# Patient Record
Sex: MALE | Race: WHITE | URBAN - METROPOLITAN AREA
[De-identification: names, ages, dates, MRNs, and addresses within clinical notes are randomized per-mention and may not be internally consistent; named-entity substitution may affect disease eponyms.]

---

## 2023-01-25 ENCOUNTER — OFFICE VISIT (OUTPATIENT)
Dept: HEMATOLOGY | Age: 58
End: 2023-01-25
Payer: COMMERCIAL

## 2023-01-25 ENCOUNTER — HOSPITAL ENCOUNTER (OUTPATIENT)
Dept: LAB | Age: 58
Discharge: HOME | End: 2023-01-25

## 2023-01-25 VITALS
BODY MASS INDEX: 27.9 KG/M2 | DIASTOLIC BLOOD PRESSURE: 78 MMHG | HEART RATE: 88 BPM | HEIGHT: 72 IN | SYSTOLIC BLOOD PRESSURE: 142 MMHG | OXYGEN SATURATION: 98 % | WEIGHT: 206 LBS | TEMPERATURE: 98.1 F

## 2023-01-25 DIAGNOSIS — R74.8 ELEVATED LIVER ENZYMES: Primary | ICD-10-CM

## 2023-01-25 LAB — SENTARA SPECIMEN COL,SENBCF: NORMAL

## 2023-01-25 PROCEDURE — 99001 SPECIMEN HANDLING PT-LAB: CPT

## 2023-01-25 RX ORDER — SIMVASTATIN 20 MG/1
TABLET, FILM COATED ORAL
COMMUNITY
Start: 2023-01-24

## 2023-01-25 NOTE — PROGRESS NOTES
[facility], [place], [place], [facility] [place], [place]      ARSALAN Dailey, [facility]NP-BC   Alicia Ernst, [facility]-   Garth Simmonds, FNP-ANANYA Sutherland, FNP-C   Mónica Blackmon, AGPCNP-BC      [address]   at [address], [address]  [address].   [phone]   FAX: [address]   at [address], [address] [place] [address], [address]   [phone]   FAX: [phone]       Patient Care Team:  Jo-Ann Fitch MD as PCP - General (Family Medicine)  Jo-Ann Fitch MD as Referring Provider [facility])      The clinicians listed above have asked me to see Leanne Saez in consultation regarding elevated liver enzymes and its management. All medical records sent by the referring physicians were reviewed including imaging studies     The patient is a 61 y.o. male who was found to have elevated liver enzymes in 10/2022. Serologic evaluation for markers of chronic liver disease has either not been performed or the results are not available. The most recent imaging of the liver was Ultrasound performed in 10/2022. Results suggest fatty liver disease. No liver mass lesions noted. The patient had not started any new medications within 3 months preceding the elevation in liver chemistries. In the office today the patient has the following symptoms: The patient feels well and has no complaints. The patient is not experiencing the following symptoms which are commonly seen with this liver disorder: fatigue, pain in the right side over the liver    The patient completes all daily activities without any functional limitations. ASSESSMENT AND PLAN:  Elevated liver enzymes  Intermittent elevation in liver transaminases of unclear etiology at this time.       Have performed laboratory testing to monitor liver function and degree of liver injury. This included   BMP, hepatic panel, CBC with platelet count. Liver transaminases are normal. ALP is normal.  Liver function is normal.  The platelet count is normal.      Based upon laboratory studies and imaging  the patient does not appear to have significant liver injury. Serologic testing for causes of chronic liver disease was ordered. Will perform additional serologic tests to screen for other causes of chronic liver disease. The most likely causes for the liver chemistry abnormalities were discussed with the patient and include   fatty liver disease    The need to perform an assessment of liver fibrosis was discussed with the patient. The Fibroscan can assess liver fibrosis and determine if a patient has advanced fibrosis or cirrhosis without the need for liver biopsy. This will be performed at the next office visit. If the Fibroscan suggests advanced fibrosis then a liver biopsy should be considered. The Fibroscan can be repeated annually or as often as clinically indicated to assess for fibrosis progression and/or regression. If the liver enzymes remain normal and the liver stiffness by Fibroscan remains normal or near normal over the next 1-2 years than no further monitoring is needed. Screening for Hepatocellular Carcinoma  HCC screening is not necessary if the patient has no evidence of cirrhosis. Treatment of other medical problems in patients with chronic liver disease  There are no contraindications for the patient to take most medications that are necessary for treatment of other medical issues. Normal doses of acetaminophen, as recommended on the label of the bottle, are not hepatotoxic except in the setting of daily alcohol use, even in patients with cirrhosis and can be utilized for pain.       Counseling for alcohol in patients with chronic liver disease  The patient was counseled regarding alcohol consumption and the effect of alcohol on chronic liver disease. The patient does not consume any significant amount of alcohol. Vaccinations   The need for vaccination against viral hepatitis A and B will be assessed with serologic and instituted as appropriate. The patient has received 2 doses and the booster dose of COVID-19 vaccine. Routine vaccinations against other bacterial and viral agents can be performed as indicated. Annual flu vaccination should be administered if indicated. ALLERGIES  No Known Allergies    MEDICATIONS  Current Outpatient Medications   Medication Sig    simvastatin (ZOCOR) 20 mg tablet      No current facility-administered medications for this visit. SYSTEM REVIEW NOT RELATED TO LIVER DISEASE OR REVIEWED ABOVE:  Constitution systems: Negative for fever, chills, weight gain, weight loss. Eyes: Negative for visual changes. ENT: Negative for sore throat, painful swallowing. Respiratory: Negative for cough, hemoptysis, SOB. Cardiology: Negative for chest pain, palpitations. GI:  Negative for constipation or diarrhea. : Negative for urinary frequency, dysuria, hematuria, nocturia. Skin: Negative for rash. Hematology: Negative for easy bruising, blood clots. Musculo-skelatal: Negative for back pain, muscle pain, weakness. Neurologic: Negative for headaches, dizziness, vertigo, memory problems not related to HE. Psychology: Negative for anxiety, depression. FAMILY HISTORY:  The father  of aortic aneurysm. The mother Has/had the following chronic disease(s): CAD, bypass. There is no family history of liver disease. SOCIAL HISTORY:  The patient is . The patient has no children. The patient stopped using tobacco products in     The patient has never consumed significant amounts of alcohol.     The patient has been abstinent from alcohol since     The patient currently works full time as custom window builder. PHYSICAL EXAMINATION:    Visit Vitals  BP (!) 142/78   Pulse 88   Temp 98.1 °F (36.7 °C)   Ht 6' (1.829 m)   Wt 206 lb (93.4 kg)   SpO2 98%   BMI 27.94 kg/m²     General: No acute distress. Eyes: Sclera anicteric. ENT: No oral lesions. Thyroid normal.  Nodes: No adenopathy. Skin: No spider angiomata. No jaundice. No palmar erythema. Respiratory: Lungs clear to auscultation. Cardiovascular: Regular heart rate. No murmurs. No JVD. Abdomen: Soft non-tender. Liver size normal to percussion/palpation. Spleen not palpable. No obvious ascites. Extremities: No edema. No muscle wasting. No gross arthritic changes. Neurologic: Alert and oriented. Cranial nerves grossly intact. No asterixis. LABORATORY STUDIES:  Liver [place] of [address] [place] [address] 1/25/2023   WBC 4.0 - 11.0 K/uL 6.0   ANC 1.8 - 7.7 K/uL 3.2   HGB 13.1 - 17.2 g/dL 14.4    - 440 K/uL 229   AST 10 - 37 U/L 21   ALT 5 - 40 U/L 35   Alk Phos 25 - 115 U/L 52   Bili, Total 0.2 - 1.2 mg/dL 0.5   Bili, Direct 0.0 - 0.3 mg/dL <0.2   Albumin 3.5 - 5.0 g/dL 4.2   BUN 6 - 22 mg/dL 15   Creat 0.5 - 1.2 mg/dL 1.1   Na 133 - 145 mmol/L 141   K 3.5 - 5.5 mmol/L 4.1   Cl 98 - 110 mmol/L 104   CO2 20 - 32 mmol/L 24   Glucose 70 - 99 mg/dL 86       SEROLOGIES:  Not available or performed. Testing was performed today. LIVER HISTOLOGY:  Not available or performed    ENDOSCOPIC PROCEDURES:  Not available or performed    RADIOLOGY:  10/2022. Ultrasound of liver. Echogenic consistent with fatty liver. No liver mass lesions. No dilated bile ducts. No ascites. OTHER TESTING:  Not available or performed    FOLLOW-UP:  All of the issues listed above in the Assessment and Plan were discussed with the patient. All questions were answered. The patient expressed a clear understanding of the above.     [facility] in 4 weeks for Fibroscan and to review all data and determine the treatment plan.       ABNER Borges-BC  [address] Liver [place] [address], [facility] [address]  [place], [address] [place] [address] Street [address]  [phone]

## 2023-01-26 PROBLEM — E78.00 HYPERCHOLESTEREMIA: Status: ACTIVE | Noted: 2023-01-26

## 2023-01-26 LAB
A-G RATIO,AGRAT: 1.6 RATIO (ref 1.1–2.6)
ABSOLUTE LYMPHOCYTE COUNT, 10803: 2.1 K/UL (ref 1–4.8)
ALBUMIN SERPL-MCNC: 4.2 G/DL (ref 3.5–5)
ALP SERPL-CCNC: 52 U/L (ref 25–115)
ALT SERPL-CCNC: 35 U/L (ref 5–40)
ANION GAP SERPL CALC-SCNC: 13 MMOL/L (ref 3–15)
AST SERPL W P-5'-P-CCNC: 21 U/L (ref 10–37)
BASOPHILS # BLD: 0.1 K/UL (ref 0–0.2)
BASOPHILS NFR BLD: 1 % (ref 0–2)
BILIRUB SERPL-MCNC: 0.5 MG/DL (ref 0.2–1.2)
BILIRUBIN, DIRECT,CBIL: <0.2 MG/DL (ref 0–0.3)
BUN SERPL-MCNC: 15 MG/DL (ref 6–22)
CALCIUM SERPL-MCNC: 9.7 MG/DL (ref 8.4–10.5)
CHLORIDE SERPL-SCNC: 104 MMOL/L (ref 98–110)
CO2 SERPL-SCNC: 24 MMOL/L (ref 20–32)
CREAT SERPL-MCNC: 1.1 MG/DL (ref 0.5–1.2)
EOSINOPHIL # BLD: 0.2 K/UL (ref 0–0.5)
EOSINOPHIL NFR BLD: 3 % (ref 0–6)
ERYTHROCYTE [DISTWIDTH] IN BLOOD BY AUTOMATED COUNT: 12.9 % (ref 10–15.5)
FE % SATURATION,PSAT: 23 % (ref 20–50)
FERRITIN SERPL-MCNC: 113 NG/ML (ref 22–322)
GLOBULIN,GLOB: 2.6 G/DL (ref 2–4)
GLOMERULAR FILTRATION RATE: >60 ML/MIN/1.73 SQ.M.
GLUCOSE SERPL-MCNC: 86 MG/DL (ref 70–99)
GRANULOCYTES,GRANS: 54 % (ref 40–75)
HBV SURFACE AB SER RIA-ACNC: NORMAL
HCT VFR BLD AUTO: 43 % (ref 39.3–51.6)
HEP B CORE AB, TOTAL, 006718: NORMAL
HEP B SURFACE AG SCRN, 006510: NORMAL
HGB BLD-MCNC: 14.4 G/DL (ref 13.1–17.2)
IRON,IRN: 72 MCG/DL (ref 45–160)
LYMPHOCYTES, LYMLT: 34 % (ref 20–45)
MCH RBC QN AUTO: 31 PG (ref 26–34)
MCHC RBC AUTO-ENTMCNC: 34 G/DL (ref 31–36)
MCV RBC AUTO: 91 FL (ref 80–95)
MONOCYTES # BLD: 0.5 K/UL (ref 0.1–1)
MONOCYTES NFR BLD: 8 % (ref 3–12)
NEUTROPHILS # BLD AUTO: 3.2 K/UL (ref 1.8–7.7)
PLATELET # BLD AUTO: 229 K/UL (ref 140–440)
PMV BLD AUTO: 10.3 FL (ref 9–13)
POTASSIUM SERPL-SCNC: 4.1 MMOL/L (ref 3.5–5.5)
PROT SERPL-MCNC: 6.8 G/DL (ref 6.4–8.3)
RBC # BLD AUTO: 4.72 M/UL (ref 3.8–5.8)
SODIUM SERPL-SCNC: 141 MMOL/L (ref 133–145)
TIBC,TIBC: 320 MCG/DL (ref 228–428)
UIBC SERPL-MCNC: 248 MCG/DL (ref 110–370)
WBC # BLD AUTO: 6 K/UL (ref 4–11)

## 2023-01-27 LAB — ANA SCREEN, 8017: NEGATIVE

## 2023-02-14 ENCOUNTER — OFFICE VISIT (OUTPATIENT)
Age: 58
End: 2023-02-14
Payer: COMMERCIAL

## 2023-02-14 VITALS
WEIGHT: 207 LBS | OXYGEN SATURATION: 98 % | TEMPERATURE: 97.5 F | HEART RATE: 87 BPM | SYSTOLIC BLOOD PRESSURE: 133 MMHG | DIASTOLIC BLOOD PRESSURE: 83 MMHG | BODY MASS INDEX: 28.04 KG/M2 | HEIGHT: 72 IN

## 2023-02-14 DIAGNOSIS — K76.0 FATTY LIVER: Primary | ICD-10-CM

## 2023-02-14 PROCEDURE — 99213 OFFICE O/P EST LOW 20 MIN: CPT | Performed by: NURSE PRACTITIONER

## 2023-02-14 PROCEDURE — 91200 LIVER ELASTOGRAPHY: CPT | Performed by: NURSE PRACTITIONER

## 2023-02-14 RX ORDER — SILDENAFIL 50 MG/1
TABLET, FILM COATED ORAL
COMMUNITY
Start: 2021-10-18

## 2023-02-14 RX ORDER — SIMVASTATIN 20 MG
TABLET ORAL
COMMUNITY
Start: 2022-09-06

## 2023-02-14 NOTE — PROGRESS NOTES
[address]40 \[facility]", MD, [address], [place], [place]      Claudean Rouleau, PA-C April S Ashworth, [facility]-BC   Yane Garland, [facility]   Mackenzie Oliver FNP-OCTAVIO Holm, FNP-C   Tata Howard, [facility]NP-BC      [address]   at [address], [address] [address]  22.   [phone]   FAX: [address]   at [address], [address] [place] [address], [address] [place] [address] Street [address] [phone]   FAX: [phone]       Patient Care Team:  Graham Gonzalez MD as PCP - General (Family Medicine)  Graham Gonzalez MD as Referring Provider (Family Medicine)      Ines Malone is being seen at [address] for management of suspected non-alcoholic fatty liver disease (NAFLD). The active problem list, all pertinent past medical history, medications, liver histology, radiologic findings, and laboratory findings related to the liver disorder were reviewed and discussed with the patient. The patient is a 61 y.o. male who was found to have elevated liver enzymes in 10/2022. Serologic evaluation for markers of chronic liver disease was negative. The most recent imaging of the liver was Ultrasound performed in 10/2022. Results suggest fatty liver disease. No liver mass lesions noted. Assessment of liver fibrosis with Fibroscan was performed in the office today. The result was 4.6 kPa which correlates with no fibrosis. The CAP score of 300 suggests hepatic steatosis. The patient had not started any new medications within 3 months preceding the elevation in liver chemistries. The patient feels well and has no complaints.     The patient is not experiencing the following symptoms which are commonly seen with this liver disorder: fatigue, pain in the right side over the liver    The patient completes all daily activities without any functional limitations. ASSESSMENT AND PLAN:  Fatty liver    Suspect the patient has fatty liver based upon imaging, Fiboscan CAP score, serologic studies that are negative for other causes of chronic liver disease    A liver biopsy has not been performed. Fibroscan in 2/2023 demonstrated  4.6 kPa and  suggesting fatty liver and no fibrosis    NAFL is a benign form of fatty liver disease and not thought to progress to fibrosis or cirrhosis. Liver transaminases are normal.  ALP is normal.  Liver function is normal. The platelet count is normal.       Based upon laboratory studies, Fibroscan, and imaging the patient does not appear to have significant liver injury. Have performed laboratory testing to monitor liver function and degree of liver injury. This included BMP, hepatic panel, CBC with platelet count. Serologic testing for causes of chronic liver disease was negative. Only a liver biopsy can confirm if the patient does have fatty liver and differentiate NAFL from TAN. The treatment is the same; weight reduction. If the liver biopsy demonstrates TAN the patient could be eligible for enrollment into clinical trials for treatment of TAN. There is no reason to perform liver biopsy at this time. Liver chemistries will be monitored. If the patient loses 20% of current body weight, which is 40 pounds, down to a weight of of 167 pounds, all steatosis will have resolved. Once all steatosis has resolved all inflammation will resolve. Then all fibrosis will gradually resolve and the liver could eventually be normal.    There is currently no FDA approved medical treatment for fatty liver, NALFD or TAN. The only medical treatments for TAN are through clinical trials. Since the fibroscan suggests there is none or minimal liver injury the patient will be monitored at 6 month intervals.     If the liver enzymes remain normal and the liver stiffness by Fibroscan remains normal or near normal over the net 1-2 years then no further monitoring is needed. Counseling for diet and weight loss in patients with confirmed or suspected NAFLD  The patient was counseled regarding diet and exercise to achieve weight loss. The best diet for patients with fatty liver is one very low in carbohydrates and enriched with protein such as an Omkar[employer] program.      The patient was told not to consume any food products and drinks containing fructose as this enhances hepatic fat synthesis. There is no medication or vitamin supplements that we advocate for TAN. Using glitazones in patients without diabetes mellitus has been shown to reduce fat content in the liver but has no effect on fibrosis and is associated with weight gain. Vitamin E has also been used but the data is not very good and most experts no longer advocate this. Screening for Hepatocellular Carcinoma  HCC screening is not necessary if the patient has no evidence of cirrhosis. Treatment of other medical problems in patients with chronic liver disease  There are no contraindications for the patient to take most medications that are necessary for treatment of other medical issues. Normal doses of acetaminophen, as recommended on the label of the bottle, are not hepatotoxic except in the setting of daily alcohol use, even in patients with cirrhosis and can be utilized for pain. Counseling for alcohol in patients with chronic liver disease  The patient was counseled regarding alcohol consumption and the effect of alcohol on chronic liver disease. The patient does not consume any significant amount of alcohol. Vaccinations   Vaccination for viral hepatitis A and B is recommended since the patient has no serologic evidence of previous exposure or vaccination with immunity. The patient has received 2 doses and the booster dose of COVID-19 vaccine.       Routine vaccinations against other bacterial and viral agents can be performed as indicated. Annual flu vaccination should be administered if indicated. ALLERGIES  No Known Allergies    MEDICATIONS  Current Outpatient Medications   Medication Sig    simvastatin (ZOCOR) 20 mg tablet      No current facility-administered medications for this visit. SYSTEM REVIEW NOT RELATED TO LIVER DISEASE OR REVIEWED ABOVE:  Constitution systems: Negative for fever, chills, weight gain, weight loss. Eyes: Negative for visual changes. ENT: Negative for sore throat, painful swallowing. Respiratory: Negative for cough, hemoptysis, SOB. Cardiology: Negative for chest pain, palpitations. GI:  Negative for constipation or diarrhea. : Negative for urinary frequency, dysuria, hematuria, nocturia. Skin: Negative for rash. Hematology: Negative for easy bruising, blood clots. Musculo-skelatal: Negative for back pain, muscle pain, weakness. Neurologic: Negative for headaches, dizziness, vertigo, memory problems not related to HE. Psychology: Negative for anxiety, depression. FAMILY HISTORY:  The father  of aortic aneurysm. The mother Has/had the following chronic disease(s): CAD, bypass. There is no family history of liver disease. SOCIAL HISTORY:  The patient is . The patient has no children. The patient stopped using tobacco products in     The patient has never consumed significant amounts of alcohol. The patient has been abstinent from alcohol since     The patient currently works full time as custom window builder. PHYSICAL EXAMINATION:    Visit Vitals  BP (!) 142/78   Pulse 88   Temp 98.1 °F (36.7 °C)   Ht 6' (1.829 m)   Wt 206 lb (93.4 kg)   SpO2 98%   BMI 27.94 kg/m²     General: No acute distress. Eyes: Sclera anicteric. ENT: No oral lesions. Thyroid normal.  Nodes: No adenopathy. Skin: No spider angiomata. No jaundice. No palmar erythema.   Respiratory: Lungs clear to auscultation. Cardiovascular: Regular heart rate. No murmurs. No JVD. Abdomen: Soft non-tender. Liver size normal to percussion/palpation. Spleen not palpable. No obvious ascites. Extremities: No edema. No muscle wasting. No gross arthritic changes. Neurologic: Alert and oriented. Cranial nerves grossly intact. No asterixis. LABORATORY STUDIES:  Liver [place] of [address] Units 1/25/2023   WBC 4.0 - 11.0 K/uL 6.0   ANC 1.8 - 7.7 K/uL 3.2   HGB 13.1 - 17.2 g/dL 14.4    - 440 K/uL 229   AST 10 - 37 U/L 21   ALT 5 - 40 U/L 35   Alk Phos 25 - 115 U/L 52   Bili, Total 0.2 - 1.2 mg/dL 0.5   Bili, Direct 0.0 - 0.3 mg/dL <0.2   Albumin 3.5 - 5.0 g/dL 4.2   BUN 6 - 22 mg/dL 15   Creat 0.5 - 1.2 mg/dL 1.1   Na 133 - 145 mmol/L 141   K 3.5 - 5.5 mmol/L 4.1   Cl 98 - 110 mmol/L 104   CO2 20 - 32 mmol/L 24   Glucose 70 - 99 mg/dL 86       SEROLOGIES:  Serologies Latest Ref Rng & Units 1/25/2023   Hep B Surface Ag None Detec None Detected   Hep B Core Ab, Total None Detec None Detected   Ferritin 22 - 322 ng/mL 113   C-ANCA Neg:<1:20 titer <1:20   ASMCA 0 - 19 Units 6       LIVER HISTOLOGY:  2/2023. [facility]. EkPa was 4.6. IQR/med 7%. . The results suggested a fibrosis level of F0. The CAP score suggests there is hepatic steatosis. ENDOSCOPIC PROCEDURES:  Not available or performed    RADIOLOGY:  10/2022. Ultrasound of liver. Echogenic consistent with fatty liver. No liver mass lesions. No dilated bile ducts. No ascites. OTHER TESTING:  Not available or performed    FOLLOW-UP:  All of the issues listed above in the Assessment and Plan were discussed with the patient. All questions were answered. The patient expressed a clear understanding of the above. [facility] 87 in 6 months to assess for the effects of diet changes and weight loss.       MARIANN Howell-BC   [facility] [address] [address] [address], [address]  [place], [address]  [phone]

## 2023-08-29 ENCOUNTER — HOSPITAL ENCOUNTER (OUTPATIENT)
Facility: HOSPITAL | Age: 58
Setting detail: SPECIMEN
Discharge: HOME | End: 2023-09-01

## 2023-08-29 ENCOUNTER — OFFICE VISIT (OUTPATIENT)
Age: 58
End: 2023-08-29
Payer: COMMERCIAL

## 2023-08-29 VITALS
TEMPERATURE: 97.3 F | BODY MASS INDEX: 25.6 KG/M2 | OXYGEN SATURATION: 98 % | WEIGHT: 189 LBS | HEART RATE: 83 BPM | HEIGHT: 72 IN

## 2023-08-29 DIAGNOSIS — K76.0 FATTY LIVER: ICD-10-CM

## 2023-08-29 DIAGNOSIS — K76.0 FATTY LIVER: Primary | ICD-10-CM

## 2023-08-29 LAB — SENTARA SPECIMEN COLLECTION: NORMAL

## 2023-08-29 PROCEDURE — 99213 OFFICE O/P EST LOW 20 MIN: CPT | Performed by: NURSE PRACTITIONER

## 2023-08-29 NOTE — PROGRESS NOTES
anicteric. ENT: No oral lesions. Thyroid normal.  Nodes: No adenopathy. Skin: No spider angiomata. No jaundice. No palmar erythema. Respiratory: Lungs clear to auscultation. Cardiovascular: Regular heart rate. No murmurs. No JVD. Abdomen: Soft non-tender. Liver size normal to percussion/palpation. Spleen not palpable. No obvious ascites. Extremities: No edema. No muscle wasting. No gross arthritic changes. Neurologic: Alert and oriented. Cranial nerves grossly intact. No asterixis. LABORATORY STUDIES:  Liver [place] of [address] [place] [address] Units 1/25/2023   WBC 4.0 - 11.0 K/uL 6.0   ANC 1.8 - 7.7 K/uL 3.2   HGB 13.1 - 17.2 g/dL 14.4    - 440 K/uL 229   AST 10 - 37 U/L 21   ALT 5 - 40 U/L 35   Alk Phos 25 - 115 U/L 52   Bili, Total 0.2 - 1.2 mg/dL 0.5   Bili, Direct 0.0 - 0.3 mg/dL <0.2   Albumin 3.5 - 5.0 g/dL 4.2   BUN 6 - 22 mg/dL 15   Creat 0.5 - 1.2 mg/dL 1.1   Na 133 - 145 mmol/L 141   K 3.5 - 5.5 mmol/L 4.1   Cl 98 - 110 mmol/L 104   CO2 20 - 32 mmol/L 24   Glucose 70 - 99 mg/dL 86       SEROLOGIES:  Serologies Latest Ref Rng & Units 1/25/2023   Hep B Surface Ag None Detec None Detected   Hep B Core Ab, Total None Detec None Detected   Ferritin 22 - 322 ng/mL 113   C-ANCA Neg:<1:20 titer <1:20   ASMCA 0 - 19 Units 6       LIVER HISTOLOGY:  2/2023. FibroScan performed at [address] [place] [address],[address] [facility]. EkPa was 4.6. IQR/med 7%. . The results suggested a fibrosis level of F0. The CAP score suggests there is hepatic steatosis. ENDOSCOPIC PROCEDURES:  Not available or performed    RADIOLOGY:  10/2022. Ultrasound of liver. Echogenic consistent with fatty liver. No liver mass lesions. No dilated bile ducts. No ascites. OTHER TESTING:  Not available or performed    FOLLOW-UP:  All of the issues listed above in the Assessment and Plan were discussed with the patient. All questions were answered. The patient expressed a clear understanding of the above.     Follow-up

## 2023-08-31 LAB
A/G RATIO: 2.3 RATIO (ref 1.1–2.6)
ALBUMIN SERPL-MCNC: 4.9 G/DL (ref 3.5–5)
ALP BLD-CCNC: 48 U/L (ref 25–115)
ALT SERPL-CCNC: 27 U/L (ref 5–40)
ANION GAP SERPL CALCULATED.3IONS-SCNC: 14 MMOL/L (ref 3–15)
AST SERPL-CCNC: 24 U/L (ref 10–37)
BASOPHILS # BLD: 1 % (ref 0–2)
BASOPHILS ABSOLUTE: 0 K/UL (ref 0–0.2)
BILIRUB SERPL-MCNC: 0.7 MG/DL (ref 0.2–1.2)
BILIRUBIN DIRECT: <0.2 MG/DL (ref 0–0.3)
BUN BLDV-MCNC: 19 MG/DL (ref 6–22)
CALCIUM SERPL-MCNC: 10.2 MG/DL (ref 8.4–10.5)
CHLORIDE BLD-SCNC: 102 MMOL/L (ref 98–110)
CO2: 24 MMOL/L (ref 20–32)
CREAT SERPL-MCNC: 1.3 MG/DL (ref 0.5–1.2)
EOSINOPHIL # BLD: 1 % (ref 0–6)
EOSINOPHILS ABSOLUTE: 0.1 K/UL (ref 0–0.5)
GLOBULIN: 2.1 G/DL (ref 2–4)
GLOMERULAR FILTRATION RATE: >60 ML/MIN/1.73 SQ.M.
GLUCOSE: 85 MG/DL (ref 70–99)
HCT VFR BLD CALC: 46.1 % (ref 39.3–51.6)
HEMOGLOBIN: 14.2 G/DL (ref 13.1–17.2)
LYMPHOCYTES # BLD: 29 % (ref 20–45)
LYMPHOCYTES ABSOLUTE: 2 K/UL (ref 1–4.8)
MCH RBC QN AUTO: 30 PG (ref 26–34)
MCHC RBC AUTO-ENTMCNC: 31 G/DL (ref 31–36)
MCV RBC AUTO: 96 FL (ref 80–95)
MONOCYTES ABSOLUTE: 0.5 K/UL (ref 0.1–1)
MONOCYTES: 7 % (ref 3–12)
NEUTROPHILS ABSOLUTE: 4.2 K/UL (ref 1.8–7.7)
NEUTROPHILS: 62 % (ref 40–75)
PDW BLD-RTO: 13.2 % (ref 10–15.5)
PLATELET # BLD: 279 K/UL (ref 140–440)
PMV BLD AUTO: 10.4 FL (ref 9–13)
POTASSIUM SERPL-SCNC: 4.8 MMOL/L (ref 3.5–5.5)
RBC: 4.79 M/UL (ref 3.8–5.8)
SODIUM BLD-SCNC: 140 MMOL/L (ref 133–145)
TOTAL PROTEIN: 7 G/DL (ref 6.4–8.3)
WBC: 6.8 K/UL (ref 4–11)

## 2024-03-04 ENCOUNTER — HOSPITAL ENCOUNTER (OUTPATIENT)
Facility: HOSPITAL | Age: 59
Setting detail: SPECIMEN
Discharge: HOME | End: 2024-03-07

## 2024-03-04 ENCOUNTER — OFFICE VISIT (OUTPATIENT)
Age: 59
End: 2024-03-04
Payer: COMMERCIAL

## 2024-03-04 VITALS
WEIGHT: 187 LBS | DIASTOLIC BLOOD PRESSURE: 73 MMHG | OXYGEN SATURATION: 96 % | TEMPERATURE: 97.1 F | BODY MASS INDEX: 25.33 KG/M2 | HEART RATE: 87 BPM | SYSTOLIC BLOOD PRESSURE: 119 MMHG | HEIGHT: 72 IN

## 2024-03-04 DIAGNOSIS — K76.0 FATTY LIVER: Primary | ICD-10-CM

## 2024-03-04 LAB — SENTARA SPECIMEN COLLECTION: NORMAL

## 2024-03-04 PROCEDURE — 99213 OFFICE O/P EST LOW 20 MIN: CPT | Performed by: NURSE PRACTITIONER

## 2024-03-04 PROCEDURE — 91200 LIVER ELASTOGRAPHY: CPT | Performed by: NURSE PRACTITIONER

## 2024-03-04 NOTE — PROGRESS NOTES
[facility]      Camilo Porter MD, FACP, FACG, FAASLD      BK Oneill-OCTAVIO Riley, [facility]   Bharti Hullmonserratsarah, [facility]   Mallorie Levi, [facility]-  Edwin Davis, [facility]   Alyson Eagle, [facility]   Charlotte Beto, [facility]   [address] [facility], [address]   [place]  [address] [phone]   FAX: [phone]  [facility]   [address] [facility], [address]   [place]  [address] [phone]   FAX: [phone]     Patient Care Team:  Sarah Puckett MD as PCP - General  Sarah Puckett MD as Referring Physician      Julio Murdock is being seen at [facility] for management of suspected non-alcoholic fatty liver disease (NAFLD). The active problem list, all pertinent past medical history, medications, liver histology, radiologic findings, and laboratory findings related to the liver disorder were reviewed and discussed with the patient.      The patient is a 58 y.o. male who was found to have elevated liver enzymes in 10/2022.      Serologic evaluation for markers of chronic liver disease was negative.     The most recent imaging of the liver was Ultrasound performed in 10/2022. Results suggest fatty liver disease. No liver mass lesions noted.    Assessment of liver fibrosis with Fibroscan was performed in the office today. The result was 5.6 kPa which correlates with no fibrosis. The CAP score of 231 does not suggest hepatic steatosis.    The patient does not have any symptoms which could be attributed to the liver disorder    The patient is not experiencing the following symptoms which are commonly seen with this liver disorder: fatigue, pain in the right side over the liver    The patient completes all daily activities without any functional limitations.      ASSESSMENT AND

## 2024-03-05 LAB
A/G RATIO: 2.2 RATIO (ref 1.1–2.6)
ALBUMIN SERPL-MCNC: 4.8 G/DL (ref 3.5–5)
ALP BLD-CCNC: 52 U/L (ref 25–115)
ALT SERPL-CCNC: 23 U/L (ref 5–40)
ANION GAP SERPL CALCULATED.3IONS-SCNC: 11 MMOL/L (ref 3–15)
AST SERPL-CCNC: 20 U/L (ref 10–37)
BASOPHILS # BLD: 1 % (ref 0–2)
BASOPHILS ABSOLUTE: 0.1 K/UL (ref 0–0.2)
BILIRUB SERPL-MCNC: 0.6 MG/DL (ref 0.2–1.2)
BILIRUBIN DIRECT: <0.2 MG/DL (ref 0–0.3)
BUN BLDV-MCNC: 21 MG/DL (ref 6–22)
CALCIUM SERPL-MCNC: 9.5 MG/DL (ref 8.4–10.5)
CHLORIDE BLD-SCNC: 104 MMOL/L (ref 98–110)
CO2: 25 MMOL/L (ref 20–32)
CREAT SERPL-MCNC: 1 MG/DL (ref 0.5–1.2)
EOSINOPHIL # BLD: 3 % (ref 0–6)
EOSINOPHILS ABSOLUTE: 0.2 K/UL (ref 0–0.5)
GLOBULIN: 2.2 G/DL (ref 2–4)
GLOMERULAR FILTRATION RATE: >60 ML/MIN/1.73 SQ.M.
GLUCOSE: 95 MG/DL (ref 70–99)
HCT VFR BLD CALC: 46.9 % (ref 39.3–51.6)
HEMOGLOBIN: 14.8 G/DL (ref 13.1–17.2)
LYMPHOCYTES # BLD: 25 % (ref 20–45)
LYMPHOCYTES ABSOLUTE: 1.6 K/UL (ref 1–4.8)
MCH RBC QN AUTO: 30 PG (ref 26–34)
MCHC RBC AUTO-ENTMCNC: 32 G/DL (ref 31–36)
MCV RBC AUTO: 95 FL (ref 80–95)
MONOCYTES ABSOLUTE: 0.4 K/UL (ref 0.1–1)
MONOCYTES: 7 % (ref 3–12)
NEUTROPHILS ABSOLUTE: 3.8 K/UL (ref 1.8–7.7)
NEUTROPHILS: 63 % (ref 40–75)
PDW BLD-RTO: 13 % (ref 10–15.5)
PLATELET # BLD: 241 K/UL (ref 140–440)
PMV BLD AUTO: 10.5 FL (ref 9–13)
POTASSIUM SERPL-SCNC: 4.4 MMOL/L (ref 3.5–5.5)
RBC: 4.93 M/UL (ref 3.8–5.8)
SODIUM BLD-SCNC: 140 MMOL/L (ref 133–145)
TOTAL PROTEIN: 7 G/DL (ref 6.4–8.3)
WBC: 6.1 K/UL (ref 4–11)

## 2025-03-06 ENCOUNTER — OFFICE VISIT (OUTPATIENT)
Age: 60
End: 2025-03-06
Payer: COMMERCIAL

## 2025-03-06 ENCOUNTER — HOSPITAL ENCOUNTER (OUTPATIENT)
Facility: HOSPITAL | Age: 60
Setting detail: SPECIMEN
Discharge: HOME | End: 2025-03-09

## 2025-03-06 VITALS
OXYGEN SATURATION: 95 % | WEIGHT: 201.4 LBS | BODY MASS INDEX: 27.28 KG/M2 | RESPIRATION RATE: 18 BRPM | SYSTOLIC BLOOD PRESSURE: 118 MMHG | HEART RATE: 84 BPM | TEMPERATURE: 96.9 F | DIASTOLIC BLOOD PRESSURE: 76 MMHG | HEIGHT: 72 IN

## 2025-03-06 DIAGNOSIS — K76.0 METABOLIC DYSFUNCTION-ASSOCIATED STEATOTIC LIVER DISEASE (MASLD): ICD-10-CM

## 2025-03-06 DIAGNOSIS — K76.0 METABOLIC DYSFUNCTION-ASSOCIATED STEATOTIC LIVER DISEASE (MASLD): Primary | ICD-10-CM

## 2025-03-06 LAB — SENTARA SPECIMEN COLLECTION: NORMAL

## 2025-03-06 PROCEDURE — 99214 OFFICE O/P EST MOD 30 MIN: CPT | Performed by: NURSE PRACTITIONER

## 2025-03-06 PROCEDURE — 91200 LIVER ELASTOGRAPHY: CPT | Performed by: NURSE PRACTITIONER

## 2025-03-06 PROCEDURE — 99001 SPECIMEN HANDLING PT-LAB: CPT

## 2025-03-06 NOTE — PROGRESS NOTES
for this visit.       SYSTEM REVIEW NOT RELATED TO LIVER DISEASE OR REVIEWED ABOVE:  Constitution systems: Negative for fever, chills, weight gain, weight loss.   GI:  Negative for constipation or diarrhea.  Hematology: Negative for easy bruising, blood clots.    Musculo-skelatal: Negative for back pain, muscle pain, weakness.  Neurologic: Negative for headaches, dizziness, vertigo, memory problems not related to HE.  Psychology: Negative for anxiety, depression.     FAMILY HISTORY:  The father  of aortic aneurysm.    The mother Has/had the following chronic disease(s): CAD, bypass.    There is no family history of liver disease.      SOCIAL HISTORY:  The patient is .    The patient has no children.     The patient stopped using tobacco products in     The patient has never consumed significant amounts of alcohol.    The patient has been abstinent from alcohol since     The patient currently works full time as custom window builder.      PHYSICAL EXAMINATION:    Visit Vitals  /76 (BP Site: Right Upper Arm, Patient Position: Sitting, BP Cuff Size: Medium Adult)   Pulse 84   Temp 96.9 °F (36.1 °C)   Resp 18   Ht 1.829 m (6')   Wt 91.4 kg (201 lb 6.4 oz)   SpO2 95%   BMI 27.31 kg/m²     General: No acute distress.   Eyes: Sclera anicteric.   Skin: No spider angiomata. No jaundice. No palmar erythema.  Abdomen: Soft non-tender. Liver size normal to percussion/palpation. Spleen not palpable. No obvious ascites.  Extremities: No edema. No muscle wasting. No gross arthritic changes.  Neurologic: Alert and oriented. Cranial nerves grossly intact.  No asterixis.    LABORATORY STUDIES:   Latest Ref [facility] 3/4/2024 3/6/2025   KATYA - Routine Labs      WBC 4.0 - 11.0 K/uL 6.1  6.6    ANC 1.8 - 7.7 K/uL 3.8  3.9    HGB 13.1 - 17.2 g/dL 14.8  15.6     - 440 K/uL 241  252    AST 10 - 37 U/L 20  23    ALT 5 - 40 U/L 23  34    Alk Phos 40 - 125 U/L 52  54    Bili, Total 0.2 - 1.2 mg/dL 0.6  0.6

## 2025-03-07 LAB
A/G RATIO: 2 RATIO (ref 1.1–2.6)
ALBUMIN: 4.7 G/DL (ref 3.5–5)
ALP BLD-CCNC: 54 U/L (ref 40–125)
ALT SERPL-CCNC: 34 U/L (ref 5–40)
ANION GAP SERPL CALCULATED.3IONS-SCNC: 15 MMOL/L (ref 3–15)
AST SERPL-CCNC: 23 U/L (ref 10–37)
BASOPHILS # BLD: 1 % (ref 0–2)
BASOPHILS ABSOLUTE: 0.1 K/UL (ref 0–0.2)
BILIRUB SERPL-MCNC: 0.6 MG/DL (ref 0.2–1.2)
BILIRUBIN DIRECT: 0.2 MG/DL (ref 0–0.3)
BUN BLDV-MCNC: 13 MG/DL (ref 6–22)
CALCIUM SERPL-MCNC: 9.9 MG/DL (ref 8.4–10.5)
CHLORIDE BLD-SCNC: 98 MMOL/L (ref 98–110)
CO2: 26 MMOL/L (ref 20–32)
CREAT SERPL-MCNC: 1.1 MG/DL (ref 0.8–1.6)
EOSINOPHIL # BLD: 2 % (ref 0–6)
EOSINOPHILS ABSOLUTE: 0.1 K/UL (ref 0–0.5)
GFR, ESTIMATED: >60 ML/MIN/1.73 SQ.M.
GLOBULIN: 2.4 G/DL (ref 2–4)
GLUCOSE: 84 MG/DL (ref 70–99)
HCT VFR BLD CALC: 47.7 % (ref 39.3–51.6)
HEMOGLOBIN: 15.6 G/DL (ref 13.1–17.2)
LYMPHOCYTES # BLD: 29 % (ref 20–45)
LYMPHOCYTES ABSOLUTE: 1.9 K/UL (ref 1–4.8)
MCH RBC QN AUTO: 31 PG (ref 26–34)
MCHC RBC AUTO-ENTMCNC: 33 G/DL (ref 31–36)
MCV RBC AUTO: 94 FL (ref 80–95)
MONOCYTES ABSOLUTE: 0.6 K/UL (ref 0.1–1)
MONOCYTES: 8 % (ref 3–12)
NEUTROPHILS ABSOLUTE: 3.9 K/UL (ref 1.8–7.7)
NEUTROPHILS SEGMENTED: 59 % (ref 40–75)
PDW BLD-RTO: 12.9 % (ref 10–15.5)
PLATELET # BLD: 252 K/UL (ref 140–440)
PMV BLD AUTO: 10.5 FL (ref 9–13)
POTASSIUM SERPL-SCNC: 4.4 MMOL/L (ref 3.5–5.5)
RBC # BLD: 5.1 M/UL (ref 3.8–5.8)
SODIUM BLD-SCNC: 139 MMOL/L (ref 133–145)
TOTAL PROTEIN: 7.1 G/DL (ref 6.2–8.1)
WBC # BLD: 6.6 K/UL (ref 4–11)

## 2025-03-24 ENCOUNTER — RESULTS FOLLOW-UP (OUTPATIENT)
Age: 60
End: 2025-03-24